# Patient Record
Sex: FEMALE | Race: WHITE
[De-identification: names, ages, dates, MRNs, and addresses within clinical notes are randomized per-mention and may not be internally consistent; named-entity substitution may affect disease eponyms.]

---

## 2017-03-31 PROBLEM — Z00.00 ENCOUNTER FOR PREVENTIVE HEALTH EXAMINATION: Status: ACTIVE | Noted: 2017-03-31

## 2017-04-03 ENCOUNTER — OTHER (OUTPATIENT)
Age: 62
End: 2017-04-03

## 2018-10-31 ENCOUNTER — NON-APPOINTMENT (OUTPATIENT)
Age: 63
End: 2018-10-31

## 2018-10-31 ENCOUNTER — APPOINTMENT (OUTPATIENT)
Dept: CARDIOLOGY | Facility: CLINIC | Age: 63
End: 2018-10-31
Payer: COMMERCIAL

## 2018-10-31 VITALS
SYSTOLIC BLOOD PRESSURE: 123 MMHG | HEART RATE: 81 BPM | DIASTOLIC BLOOD PRESSURE: 79 MMHG | WEIGHT: 143 LBS | OXYGEN SATURATION: 100 % | BODY MASS INDEX: 23.82 KG/M2 | HEIGHT: 65 IN

## 2018-10-31 DIAGNOSIS — F17.200 NICOTINE DEPENDENCE, UNSPECIFIED, UNCOMPLICATED: ICD-10-CM

## 2018-10-31 DIAGNOSIS — R94.31 ABNORMAL ELECTROCARDIOGRAM [ECG] [EKG]: ICD-10-CM

## 2018-10-31 DIAGNOSIS — R00.2 PALPITATIONS: ICD-10-CM

## 2018-10-31 DIAGNOSIS — Z78.9 OTHER SPECIFIED HEALTH STATUS: ICD-10-CM

## 2018-10-31 PROCEDURE — 93000 ELECTROCARDIOGRAM COMPLETE: CPT

## 2018-10-31 PROCEDURE — 99245 OFF/OP CONSLTJ NEW/EST HI 55: CPT

## 2018-12-05 ENCOUNTER — APPOINTMENT (OUTPATIENT)
Dept: CARDIOLOGY | Facility: CLINIC | Age: 63
End: 2018-12-05

## 2020-02-04 ENCOUNTER — APPOINTMENT (OUTPATIENT)
Dept: BREAST CENTER | Facility: CLINIC | Age: 65
End: 2020-02-04

## 2020-09-02 ENCOUNTER — RECORD ABSTRACTING (OUTPATIENT)
Age: 65
End: 2020-09-02

## 2020-09-02 DIAGNOSIS — Z86.69 PERSONAL HISTORY OF OTHER DISEASES OF THE NERVOUS SYSTEM AND SENSE ORGANS: ICD-10-CM

## 2020-09-02 DIAGNOSIS — H91.93 UNSPECIFIED HEARING LOSS, BILATERAL: ICD-10-CM

## 2020-09-02 DIAGNOSIS — Z87.09 PERSONAL HISTORY OF OTHER DISEASES OF THE RESPIRATORY SYSTEM: ICD-10-CM

## 2020-09-02 DIAGNOSIS — Z87.828 PERSONAL HISTORY OF OTHER (HEALED) PHYSICAL INJURY AND TRAUMA: ICD-10-CM

## 2020-09-03 ENCOUNTER — APPOINTMENT (OUTPATIENT)
Dept: OTOLARYNGOLOGY | Facility: CLINIC | Age: 65
End: 2020-09-03
Payer: COMMERCIAL

## 2020-09-03 VITALS
HEIGHT: 65 IN | HEART RATE: 63 BPM | DIASTOLIC BLOOD PRESSURE: 84 MMHG | BODY MASS INDEX: 23.82 KG/M2 | WEIGHT: 143 LBS | TEMPERATURE: 98.6 F | SYSTOLIC BLOOD PRESSURE: 117 MMHG

## 2020-09-03 DIAGNOSIS — H91.20 SUDDEN IDIOPATHIC HEARING LOSS, UNSPECIFIED EAR: ICD-10-CM

## 2020-09-03 DIAGNOSIS — H69.82 OTHER SPECIFIED DISORDERS OF EUSTACHIAN TUBE, LEFT EAR: ICD-10-CM

## 2020-09-03 PROCEDURE — 99213 OFFICE O/P EST LOW 20 MIN: CPT | Mod: 25

## 2020-09-03 PROCEDURE — 92557 COMPREHENSIVE HEARING TEST: CPT

## 2020-09-03 PROCEDURE — 92570 ACOUSTIC IMMITANCE TESTING: CPT

## 2020-09-03 NOTE — REVIEW OF SYSTEMS
[Seasonal Allergies] : seasonal allergies [As Noted in HPI] : as noted in HPI [Negative] : Head and Neck [Sneezing] : no sneezing [Post Nasal Drip] : no post nasal drip

## 2020-09-03 NOTE — HISTORY OF PRESENT ILLNESS
[de-identified] : 65 yr old female. c/o clogged ears.  -tinntius, dizzy, otalgia\par -hx otitis, noise exp, head trauma\par +FH presbycusis

## 2021-09-18 ENCOUNTER — TRANSCRIPTION ENCOUNTER (OUTPATIENT)
Age: 66
End: 2021-09-18

## 2021-10-14 ENCOUNTER — APPOINTMENT (OUTPATIENT)
Dept: OTOLARYNGOLOGY | Facility: CLINIC | Age: 66
End: 2021-10-14
Payer: COMMERCIAL

## 2021-10-14 VITALS
SYSTOLIC BLOOD PRESSURE: 127 MMHG | BODY MASS INDEX: 24.32 KG/M2 | WEIGHT: 146 LBS | HEART RATE: 66 BPM | DIASTOLIC BLOOD PRESSURE: 83 MMHG | HEIGHT: 65 IN

## 2021-10-14 PROCEDURE — 69210 REMOVE IMPACTED EAR WAX UNI: CPT

## 2021-10-14 PROCEDURE — 99213 OFFICE O/P EST LOW 20 MIN: CPT | Mod: 25

## 2021-10-14 RX ORDER — FAMOTIDINE 40 MG/1
TABLET, FILM COATED ORAL
Refills: 0 | Status: ACTIVE | COMMUNITY

## 2021-10-14 NOTE — HISTORY OF PRESENT ILLNESS
[de-identified] : 66 yr old female. c/o clogged ears.  -tinnitus, dizzy, otalgia\par -hx otitis, noise exp, head trauma\par +FH presbycusis

## 2021-10-14 NOTE — REVIEW OF SYSTEMS
[Seasonal Allergies] : seasonal allergies [As Noted in HPI] : as noted in HPI [Negative] : Constitutional [Sneezing] : no sneezing [Post Nasal Drip] : no post nasal drip

## 2022-07-08 ENCOUNTER — APPOINTMENT (OUTPATIENT)
Dept: OTOLARYNGOLOGY | Facility: CLINIC | Age: 67
End: 2022-07-08

## 2022-07-08 VITALS
SYSTOLIC BLOOD PRESSURE: 133 MMHG | HEART RATE: 62 BPM | BODY MASS INDEX: 24.66 KG/M2 | WEIGHT: 148 LBS | HEIGHT: 65 IN | DIASTOLIC BLOOD PRESSURE: 88 MMHG

## 2022-07-08 DIAGNOSIS — Q16.1 CONGENITAL ABSENCE, ATRESIA AND STRICTURE OF AUDITORY CANAL (EXTERNAL): ICD-10-CM

## 2022-07-08 DIAGNOSIS — H60.549 ACUTE ECZEMATOID OTITIS EXTERNA, UNSPECIFIED EAR: ICD-10-CM

## 2022-07-08 DIAGNOSIS — H61.20 IMPACTED CERUMEN, UNSPECIFIED EAR: ICD-10-CM

## 2022-07-08 PROCEDURE — 99213 OFFICE O/P EST LOW 20 MIN: CPT | Mod: 25

## 2022-07-08 PROCEDURE — 69210 REMOVE IMPACTED EAR WAX UNI: CPT

## 2022-07-08 NOTE — PHYSICAL EXAM
[de-identified] : IMP-ACTED CERUMEN REMOVED/ CANAL NARROW/ ECZEMA [Normal] : mucosa is normal [Midline] : trachea located in midline position

## 2022-12-01 ENCOUNTER — NON-APPOINTMENT (OUTPATIENT)
Age: 67
End: 2022-12-01

## 2022-12-01 ENCOUNTER — APPOINTMENT (OUTPATIENT)
Dept: OTOLARYNGOLOGY | Facility: CLINIC | Age: 67
End: 2022-12-01

## 2022-12-01 VITALS
BODY MASS INDEX: 24.16 KG/M2 | HEART RATE: 76 BPM | DIASTOLIC BLOOD PRESSURE: 78 MMHG | HEIGHT: 65 IN | WEIGHT: 145 LBS | SYSTOLIC BLOOD PRESSURE: 117 MMHG

## 2022-12-01 PROCEDURE — 99213 OFFICE O/P EST LOW 20 MIN: CPT | Mod: 25

## 2022-12-01 PROCEDURE — 69210 REMOVE IMPACTED EAR WAX UNI: CPT

## 2022-12-01 RX ORDER — HYDROCORTISONE AND ACETIC ACID OTIC 20.75; 10.375 MG/ML; MG/ML
1-2 SOLUTION AURICULAR (OTIC)
Qty: 1 | Refills: 1 | Status: ACTIVE | COMMUNITY
Start: 2022-12-01 | End: 1900-01-01

## 2022-12-01 RX ORDER — COVID-19 ANTIGEN TEST
KIT MISCELLANEOUS
Qty: 4 | Refills: 0 | Status: DISCONTINUED | COMMUNITY
Start: 2022-09-24

## 2022-12-01 NOTE — HISTORY OF PRESENT ILLNESS
[de-identified] : 67 yr old female. c/o clogged ears.  occ feel wet, responded well to COS over the summer\par  -tinnitus, dizzy, otalgia\par -hx otitis, noise exp, head trauma\par +FH presbycusis

## 2023-11-13 ENCOUNTER — APPOINTMENT (OUTPATIENT)
Dept: OTOLARYNGOLOGY | Facility: CLINIC | Age: 68
End: 2023-11-13
Payer: MEDICARE

## 2023-11-13 VITALS
WEIGHT: 147 LBS | HEIGHT: 65.5 IN | DIASTOLIC BLOOD PRESSURE: 90 MMHG | SYSTOLIC BLOOD PRESSURE: 135 MMHG | BODY MASS INDEX: 24.2 KG/M2 | HEART RATE: 76 BPM

## 2023-11-13 DIAGNOSIS — H61.23 IMPACTED CERUMEN, BILATERAL: ICD-10-CM

## 2023-11-13 DIAGNOSIS — H60.61 UNSPECIFIED CHRONIC OTITIS EXTERNA, RIGHT EAR: ICD-10-CM

## 2023-11-13 DIAGNOSIS — H93.293 OTHER ABNORMAL AUDITORY PERCEPTIONS, BILATERAL: ICD-10-CM

## 2023-11-13 PROCEDURE — 99213 OFFICE O/P EST LOW 20 MIN: CPT | Mod: 25

## 2023-11-13 PROCEDURE — 69210 REMOVE IMPACTED EAR WAX UNI: CPT

## 2023-11-13 RX ORDER — HYDROCORTISONE AND ACETIC ACID OTIC 20.75; 10.375 MG/ML; MG/ML
1-2 SOLUTION AURICULAR (OTIC) 4 TIMES DAILY
Qty: 1 | Refills: 3 | Status: ACTIVE | COMMUNITY
Start: 2023-11-13 | End: 1900-01-01

## 2023-11-13 RX ORDER — NEOMYCIN AND POLYMYXIN B SULFATES AND HYDROCORTISONE OTIC 10; 3.5; 1 MG/ML; MG/ML; [USP'U]/ML
3.5-10000-1 SUSPENSION AURICULAR (OTIC) 4 TIMES DAILY
Qty: 1 | Refills: 1 | Status: DISCONTINUED | COMMUNITY
Start: 2022-08-19 | End: 2023-11-13

## 2024-11-14 ENCOUNTER — APPOINTMENT (OUTPATIENT)
Dept: OTOLARYNGOLOGY | Facility: CLINIC | Age: 69
End: 2024-11-14
Payer: MEDICARE

## 2024-11-14 VITALS
HEART RATE: 71 BPM | HEIGHT: 65.5 IN | BODY MASS INDEX: 23.21 KG/M2 | WEIGHT: 141 LBS | DIASTOLIC BLOOD PRESSURE: 85 MMHG | SYSTOLIC BLOOD PRESSURE: 122 MMHG

## 2024-11-14 DIAGNOSIS — H61.23 IMPACTED CERUMEN, BILATERAL: ICD-10-CM

## 2024-11-14 DIAGNOSIS — H93.293 OTHER ABNORMAL AUDITORY PERCEPTIONS, BILATERAL: ICD-10-CM

## 2024-11-14 PROCEDURE — 92557 COMPREHENSIVE HEARING TEST: CPT

## 2024-11-14 PROCEDURE — 99213 OFFICE O/P EST LOW 20 MIN: CPT | Mod: 25

## 2024-11-14 PROCEDURE — G0268 REMOVAL OF IMPACTED WAX MD: CPT

## 2024-11-14 PROCEDURE — 92567 TYMPANOMETRY: CPT

## 2024-12-24 PROBLEM — F10.90 ALCOHOL USE: Status: ACTIVE | Noted: 2018-10-31

## 2025-06-25 ENCOUNTER — APPOINTMENT (OUTPATIENT)
Dept: CT IMAGING | Facility: CLINIC | Age: 70
End: 2025-06-25
Payer: MEDICARE

## 2025-06-25 ENCOUNTER — OUTPATIENT (OUTPATIENT)
Dept: OUTPATIENT SERVICES | Facility: HOSPITAL | Age: 70
LOS: 1 days | End: 2025-06-25
Payer: MEDICARE

## 2025-06-25 ENCOUNTER — RESULT REVIEW (OUTPATIENT)
Age: 70
End: 2025-06-25

## 2025-06-25 DIAGNOSIS — Z00.00 ENCOUNTER FOR GENERAL ADULT MEDICAL EXAMINATION WITHOUT ABNORMAL FINDINGS: ICD-10-CM

## 2025-06-25 PROCEDURE — 74177 CT ABD & PELVIS W/CONTRAST: CPT | Mod: MH

## 2025-06-25 PROCEDURE — 74177 CT ABD & PELVIS W/CONTRAST: CPT | Mod: 26

## 2025-07-24 ENCOUNTER — APPOINTMENT (OUTPATIENT)
Dept: SURGERY | Facility: CLINIC | Age: 70
End: 2025-07-24
Payer: MEDICARE

## 2025-07-24 ENCOUNTER — NON-APPOINTMENT (OUTPATIENT)
Age: 70
End: 2025-07-24

## 2025-07-24 VITALS
BODY MASS INDEX: 24.03 KG/M2 | HEIGHT: 65.5 IN | TEMPERATURE: 97.7 F | WEIGHT: 146 LBS | SYSTOLIC BLOOD PRESSURE: 128 MMHG | HEART RATE: 76 BPM | DIASTOLIC BLOOD PRESSURE: 81 MMHG | OXYGEN SATURATION: 96 %

## 2025-07-24 DIAGNOSIS — Z78.9 OTHER SPECIFIED HEALTH STATUS: ICD-10-CM

## 2025-07-24 DIAGNOSIS — Z83.79 FAMILY HISTORY OF OTHER DISEASES OF THE DIGESTIVE SYSTEM: ICD-10-CM

## 2025-07-24 DIAGNOSIS — K57.32 DIVERTICULITIS OF LARGE INTESTINE W/OUT PERFORATION OR ABSCESS W/OUT BLEEDING: ICD-10-CM

## 2025-07-24 DIAGNOSIS — Z80.3 FAMILY HISTORY OF MALIGNANT NEOPLASM OF BREAST: ICD-10-CM

## 2025-07-24 DIAGNOSIS — Z87.891 PERSONAL HISTORY OF NICOTINE DEPENDENCE: ICD-10-CM

## 2025-07-24 DIAGNOSIS — Z80.0 FAMILY HISTORY OF MALIGNANT NEOPLASM OF DIGESTIVE ORGANS: ICD-10-CM

## 2025-07-24 DIAGNOSIS — Z85.3 PERSONAL HISTORY OF MALIGNANT NEOPLASM OF BREAST: ICD-10-CM

## 2025-07-24 PROCEDURE — 99204 OFFICE O/P NEW MOD 45 MIN: CPT

## 2025-07-24 RX ORDER — OMEPRAZOLE 20 MG/1
TABLET, ORALLY DISINTEGRATING, DELAYED RELEASE ORAL
Refills: 0 | Status: ACTIVE | COMMUNITY